# Patient Record
Sex: FEMALE | Race: WHITE | NOT HISPANIC OR LATINO | Employment: OTHER | URBAN - METROPOLITAN AREA
[De-identification: names, ages, dates, MRNs, and addresses within clinical notes are randomized per-mention and may not be internally consistent; named-entity substitution may affect disease eponyms.]

---

## 2018-08-13 ENCOUNTER — ESTABLISHED COMPREHENSIVE EXAM (OUTPATIENT)
Dept: URBAN - METROPOLITAN AREA CLINIC 36 | Facility: CLINIC | Age: 63
End: 2018-08-13

## 2018-08-13 DIAGNOSIS — H35.363: ICD-10-CM

## 2018-08-13 DIAGNOSIS — H44.23: ICD-10-CM

## 2018-08-13 DIAGNOSIS — H25.9: ICD-10-CM

## 2018-08-13 PROCEDURE — 92014 COMPRE OPH EXAM EST PT 1/>: CPT

## 2018-08-13 PROCEDURE — 92015 DETERMINE REFRACTIVE STATE: CPT

## 2018-08-13 ASSESSMENT — TONOMETRY
OD_IOP_MMHG: 16
OS_IOP_MMHG: 16

## 2018-08-13 ASSESSMENT — VISUAL ACUITY
OD_CC: 20/30
OD_CC: J3
OS_CC: J6
OS_PH: 20/40-1
OD_SC: CF 5FT
OS_CC: 20/60-2
OS_SC: CF 3FT

## 2019-04-23 ENCOUNTER — ESTABLISHED PATIENT (OUTPATIENT)
Dept: URBAN - METROPOLITAN AREA CLINIC 36 | Facility: CLINIC | Age: 64
End: 2019-04-23

## 2019-04-23 DIAGNOSIS — D23.111: ICD-10-CM

## 2019-04-23 PROCEDURE — 92285 EXTERNAL OCULAR PHOTOGRAPHY: CPT

## 2019-04-23 PROCEDURE — 99213 OFFICE O/P EST LOW 20 MIN: CPT

## 2019-04-23 PROCEDURE — 67840 REMOVE EYELID LESION: CPT

## 2019-04-23 RX ORDER — ERYTHROMYCIN 5 MG/G: OINTMENT OPHTHALMIC

## 2019-04-23 ASSESSMENT — VISUAL ACUITY
OD_CC: 20/40
OS_CC: 20/70

## 2019-09-10 ENCOUNTER — ESTABLISHED COMPREHENSIVE EXAM (OUTPATIENT)
Dept: URBAN - METROPOLITAN AREA CLINIC 36 | Facility: CLINIC | Age: 64
End: 2019-09-10

## 2019-09-10 DIAGNOSIS — H35.3132: ICD-10-CM

## 2019-09-10 DIAGNOSIS — H43.813: ICD-10-CM

## 2019-09-10 DIAGNOSIS — H52.13: ICD-10-CM

## 2019-09-10 DIAGNOSIS — H52.7: ICD-10-CM

## 2019-09-10 DIAGNOSIS — H25.812: ICD-10-CM

## 2019-09-10 DIAGNOSIS — H25.811: ICD-10-CM

## 2019-09-10 PROCEDURE — 92014 COMPRE OPH EXAM EST PT 1/>: CPT

## 2019-09-10 PROCEDURE — 92015 DETERMINE REFRACTIVE STATE: CPT

## 2019-09-10 PROCEDURE — 92134 CPTRZ OPH DX IMG PST SGM RTA: CPT

## 2019-09-10 ASSESSMENT — VISUAL ACUITY
OS_CC: <J10
OD_CC: 20/40-2
OS_SC: CF 6FT
OD_CC: J3
OD_SC: CF 6FT
OS_CC: 20/200

## 2019-09-10 ASSESSMENT — TONOMETRY
OD_IOP_MMHG: 18
OS_IOP_MMHG: 18

## 2020-02-19 ENCOUNTER — RETINA CONSULT (OUTPATIENT)
Dept: URBAN - METROPOLITAN AREA CLINIC 36 | Facility: CLINIC | Age: 65
End: 2020-02-19

## 2020-02-19 DIAGNOSIS — H35.3122: ICD-10-CM

## 2020-02-19 DIAGNOSIS — H35.363: ICD-10-CM

## 2020-02-19 DIAGNOSIS — H43.813: ICD-10-CM

## 2020-02-19 DIAGNOSIS — H35.723: ICD-10-CM

## 2020-02-19 DIAGNOSIS — H35.3112: ICD-10-CM

## 2020-02-19 PROCEDURE — 92235 FLUORESCEIN ANGRPH MLTIFRAME: CPT

## 2020-02-19 PROCEDURE — 92250 FUNDUS PHOTOGRAPHY W/I&R: CPT

## 2020-02-19 PROCEDURE — 92134 CPTRZ OPH DX IMG PST SGM RTA: CPT

## 2020-02-19 PROCEDURE — 92014 COMPRE OPH EXAM EST PT 1/>: CPT

## 2020-02-19 ASSESSMENT — VISUAL ACUITY
OS_CC: 20/60
OD_CC: 20/30-2

## 2020-02-19 ASSESSMENT — TONOMETRY
OS_IOP_MMHG: 13
OD_IOP_MMHG: 14

## 2020-05-20 ENCOUNTER — ESTABLISHED PATIENT (OUTPATIENT)
Dept: URBAN - METROPOLITAN AREA CLINIC 43 | Facility: CLINIC | Age: 65
End: 2020-05-20

## 2020-05-20 DIAGNOSIS — H35.3211: ICD-10-CM

## 2020-05-20 DIAGNOSIS — H35.733: ICD-10-CM

## 2020-05-20 DIAGNOSIS — H35.363: ICD-10-CM

## 2020-05-20 DIAGNOSIS — H35.3221: ICD-10-CM

## 2020-05-20 DIAGNOSIS — H43.813: ICD-10-CM

## 2020-05-20 PROCEDURE — 92201 OPSCPY EXTND RTA DRAW UNI/BI: CPT

## 2020-05-20 PROCEDURE — 92014 COMPRE OPH EXAM EST PT 1/>: CPT

## 2020-05-20 PROCEDURE — 92242 FLUORESCEIN&ICG ANGIOGRAPHY: CPT

## 2020-05-20 PROCEDURE — 92134 CPTRZ OPH DX IMG PST SGM RTA: CPT

## 2020-05-20 PROCEDURE — 6702850 BILATERAL INTRAVITREAL INJECTION

## 2020-05-20 ASSESSMENT — VISUAL ACUITY
OS_CC: 20/70-2
OD_CC: 20/40+2

## 2020-06-30 ENCOUNTER — ESTABLISHED PATIENT (OUTPATIENT)
Dept: URBAN - METROPOLITAN AREA CLINIC 36 | Facility: CLINIC | Age: 65
End: 2020-06-30

## 2020-06-30 DIAGNOSIS — H43.813: ICD-10-CM

## 2020-06-30 DIAGNOSIS — H35.3211: ICD-10-CM

## 2020-06-30 DIAGNOSIS — H35.733: ICD-10-CM

## 2020-06-30 DIAGNOSIS — H35.363: ICD-10-CM

## 2020-06-30 DIAGNOSIS — H35.3221: ICD-10-CM

## 2020-06-30 PROCEDURE — 92014 COMPRE OPH EXAM EST PT 1/>: CPT

## 2020-06-30 PROCEDURE — 92201 OPSCPY EXTND RTA DRAW UNI/BI: CPT

## 2020-06-30 PROCEDURE — 6702850 BILATERAL INTRAVITREAL INJECTION

## 2020-06-30 PROCEDURE — 92134 CPTRZ OPH DX IMG PST SGM RTA: CPT

## 2020-06-30 ASSESSMENT — VISUAL ACUITY
OS_CC: 20/70+2
OD_CC: 20/40+1

## 2020-06-30 ASSESSMENT — TONOMETRY
OD_IOP_MMHG: 11
OS_IOP_MMHG: 12

## 2020-07-28 ENCOUNTER — ESTABLISHED PATIENT (OUTPATIENT)
Dept: URBAN - METROPOLITAN AREA CLINIC 36 | Facility: CLINIC | Age: 65
End: 2020-07-28

## 2020-07-28 DIAGNOSIS — H35.363: ICD-10-CM

## 2020-07-28 DIAGNOSIS — H43.813: ICD-10-CM

## 2020-07-28 DIAGNOSIS — H35.733: ICD-10-CM

## 2020-07-28 DIAGNOSIS — H35.3221: ICD-10-CM

## 2020-07-28 DIAGNOSIS — H35.3211: ICD-10-CM

## 2020-07-28 PROCEDURE — 92134 CPTRZ OPH DX IMG PST SGM RTA: CPT

## 2020-07-28 PROCEDURE — 92012 INTRM OPH EXAM EST PATIENT: CPT

## 2020-07-28 PROCEDURE — 6702850 BILATERAL INTRAVITREAL INJECTION

## 2020-07-28 PROCEDURE — 92250 FUNDUS PHOTOGRAPHY W/I&R: CPT

## 2020-07-28 PROCEDURE — 92235 FLUORESCEIN ANGRPH MLTIFRAME: CPT

## 2020-07-28 ASSESSMENT — VISUAL ACUITY
OD_CC: 20/40-1
OS_CC: 20/100

## 2020-07-28 ASSESSMENT — TONOMETRY
OD_IOP_MMHG: 12
OS_IOP_MMHG: 14

## 2020-09-02 ENCOUNTER — ESTABLISHED PATIENT (OUTPATIENT)
Dept: URBAN - METROPOLITAN AREA CLINIC 36 | Facility: CLINIC | Age: 65
End: 2020-09-02

## 2020-09-02 DIAGNOSIS — H35.30: ICD-10-CM

## 2020-09-02 DIAGNOSIS — H35.3221: ICD-10-CM

## 2020-09-02 DIAGNOSIS — H35.733: ICD-10-CM

## 2020-09-02 DIAGNOSIS — H43.813: ICD-10-CM

## 2020-09-02 DIAGNOSIS — H35.363: ICD-10-CM

## 2020-09-02 DIAGNOSIS — H35.3211: ICD-10-CM

## 2020-09-02 PROCEDURE — 92202 OPSCPY EXTND ON/MAC DRAW: CPT

## 2020-09-02 PROCEDURE — 92134 CPTRZ OPH DX IMG PST SGM RTA: CPT

## 2020-09-02 PROCEDURE — 92012 INTRM OPH EXAM EST PATIENT: CPT

## 2020-09-02 PROCEDURE — 6702850 BILATERAL INTRAVITREAL INJECTION

## 2020-09-02 ASSESSMENT — TONOMETRY
OS_IOP_MMHG: 17
OD_IOP_MMHG: 16

## 2020-09-02 ASSESSMENT — VISUAL ACUITY
OS_CC: 20/70
OD_CC: 20/40-2

## 2020-09-17 ENCOUNTER — ESTABLISHED COMPREHENSIVE EXAM (OUTPATIENT)
Dept: URBAN - METROPOLITAN AREA CLINIC 36 | Facility: CLINIC | Age: 65
End: 2020-09-17

## 2020-09-17 DIAGNOSIS — H52.7: ICD-10-CM

## 2020-09-17 DIAGNOSIS — H25.812: ICD-10-CM

## 2020-09-17 DIAGNOSIS — H01.003: ICD-10-CM

## 2020-09-17 DIAGNOSIS — H01.006: ICD-10-CM

## 2020-09-17 DIAGNOSIS — H25.811: ICD-10-CM

## 2020-09-17 PROCEDURE — 92015 DETERMINE REFRACTIVE STATE: CPT

## 2020-09-17 PROCEDURE — 99212 OFFICE O/P EST SF 10 MIN: CPT

## 2020-09-17 ASSESSMENT — VISUAL ACUITY
OD_SC: CF 6FT
OD_CC: 20/30-1
OS_SC: J3
OS_SC: CF 5FT
OS_CC: 20/100+1
OD_SC: J1
OD_CC: J5
OS_CC: J10

## 2020-09-17 ASSESSMENT — TONOMETRY
OS_IOP_MMHG: 18
OD_IOP_MMHG: 17

## 2020-10-05 ENCOUNTER — ESTABLISHED PATIENT (OUTPATIENT)
Dept: URBAN - METROPOLITAN AREA CLINIC 43 | Facility: CLINIC | Age: 65
End: 2020-10-05

## 2020-10-05 DIAGNOSIS — H35.3221: ICD-10-CM

## 2020-10-05 DIAGNOSIS — H43.813: ICD-10-CM

## 2020-10-05 DIAGNOSIS — H35.3211: ICD-10-CM

## 2020-10-05 DIAGNOSIS — H35.363: ICD-10-CM

## 2020-10-05 DIAGNOSIS — H35.733: ICD-10-CM

## 2020-10-05 PROCEDURE — 92014 COMPRE OPH EXAM EST PT 1/>: CPT

## 2020-10-05 PROCEDURE — 6702850 BILATERAL INTRAVITREAL INJECTION

## 2020-10-05 PROCEDURE — 92202 OPSCPY EXTND ON/MAC DRAW: CPT

## 2020-10-05 PROCEDURE — 92134 CPTRZ OPH DX IMG PST SGM RTA: CPT

## 2020-10-05 ASSESSMENT — TONOMETRY
OS_IOP_MMHG: 16
OD_IOP_MMHG: 16

## 2020-10-05 ASSESSMENT — VISUAL ACUITY
OS_SC: 20/70-1
OD_SC: 20/40

## 2020-11-17 ENCOUNTER — ESTABLISHED PATIENT (OUTPATIENT)
Dept: URBAN - METROPOLITAN AREA CLINIC 36 | Facility: CLINIC | Age: 65
End: 2020-11-17

## 2020-11-17 DIAGNOSIS — H35.3211: ICD-10-CM

## 2020-11-17 DIAGNOSIS — H35.3221: ICD-10-CM

## 2020-11-17 DIAGNOSIS — H35.733: ICD-10-CM

## 2020-11-17 DIAGNOSIS — H43.813: ICD-10-CM

## 2020-11-17 DIAGNOSIS — H35.363: ICD-10-CM

## 2020-11-17 PROCEDURE — 6702850 BILATERAL INTRAVITREAL INJECTION

## 2020-11-17 PROCEDURE — 92235 FLUORESCEIN ANGRPH MLTIFRAME: CPT

## 2020-11-17 PROCEDURE — 92012 INTRM OPH EXAM EST PATIENT: CPT

## 2020-11-17 PROCEDURE — 92250 FUNDUS PHOTOGRAPHY W/I&R: CPT

## 2020-11-17 ASSESSMENT — VISUAL ACUITY
OD_CC: 20/40
OS_CC: 20/70
OS_PH: 20/50

## 2020-11-17 ASSESSMENT — TONOMETRY
OS_IOP_MMHG: 15
OD_IOP_MMHG: 14

## 2021-01-12 ENCOUNTER — ESTABLISHED PATIENT (OUTPATIENT)
Dept: URBAN - METROPOLITAN AREA CLINIC 36 | Facility: CLINIC | Age: 66
End: 2021-01-12

## 2021-01-12 DIAGNOSIS — H35.30: ICD-10-CM

## 2021-01-12 DIAGNOSIS — H35.733: ICD-10-CM

## 2021-01-12 DIAGNOSIS — H43.813: ICD-10-CM

## 2021-01-12 DIAGNOSIS — H35.363: ICD-10-CM

## 2021-01-12 DIAGNOSIS — H35.3221: ICD-10-CM

## 2021-01-12 DIAGNOSIS — H35.3211: ICD-10-CM

## 2021-01-12 PROCEDURE — 92250 FUNDUS PHOTOGRAPHY W/I&R: CPT

## 2021-01-12 PROCEDURE — 92235 FLUORESCEIN ANGRPH MLTIFRAME: CPT

## 2021-01-12 PROCEDURE — 99213 OFFICE O/P EST LOW 20 MIN: CPT

## 2021-01-12 PROCEDURE — 6702850 BILATERAL INTRAVITREAL INJECTION

## 2021-01-12 ASSESSMENT — TONOMETRY
OD_IOP_MMHG: 11
OS_IOP_MMHG: 12

## 2021-01-12 ASSESSMENT — VISUAL ACUITY
OD_CC: 20/30-2
OS_CC: 20/50
OS_PH: 20/40-1

## 2021-03-17 ENCOUNTER — ESTABLISHED PATIENT (OUTPATIENT)
Dept: URBAN - METROPOLITAN AREA CLINIC 36 | Facility: CLINIC | Age: 66
End: 2021-03-17

## 2021-03-17 DIAGNOSIS — H43.813: ICD-10-CM

## 2021-03-17 DIAGNOSIS — H35.3211: ICD-10-CM

## 2021-03-17 DIAGNOSIS — H35.3221: ICD-10-CM

## 2021-03-17 DIAGNOSIS — H35.363: ICD-10-CM

## 2021-03-17 DIAGNOSIS — H35.733: ICD-10-CM

## 2021-03-17 PROCEDURE — 6702850 BILATERAL INTRAVITREAL INJECTION

## 2021-03-17 PROCEDURE — 99213 OFFICE O/P EST LOW 20 MIN: CPT

## 2021-03-17 PROCEDURE — 92250 FUNDUS PHOTOGRAPHY W/I&R: CPT

## 2021-03-17 ASSESSMENT — VISUAL ACUITY
OS_PH: 20/40-2
OS_CC: 20/60-1
OD_CC: 20/40-1

## 2021-03-17 ASSESSMENT — TONOMETRY
OD_IOP_MMHG: 20
OS_IOP_MMHG: 20

## 2021-06-09 ENCOUNTER — ESTABLISHED PATIENT (OUTPATIENT)
Dept: URBAN - METROPOLITAN AREA CLINIC 36 | Facility: CLINIC | Age: 66
End: 2021-06-09

## 2021-06-09 DIAGNOSIS — H35.3221: ICD-10-CM

## 2021-06-09 DIAGNOSIS — H35.733: ICD-10-CM

## 2021-06-09 DIAGNOSIS — H43.813: ICD-10-CM

## 2021-06-09 DIAGNOSIS — H35.3211: ICD-10-CM

## 2021-06-09 DIAGNOSIS — H35.363: ICD-10-CM

## 2021-06-09 PROCEDURE — 92250 FUNDUS PHOTOGRAPHY W/I&R: CPT

## 2021-06-09 PROCEDURE — 6702850 BILATERAL INTRAVITREAL INJECTION

## 2021-06-09 PROCEDURE — 92235 FLUORESCEIN ANGRPH MLTIFRAME: CPT

## 2021-06-09 PROCEDURE — 99213 OFFICE O/P EST LOW 20 MIN: CPT

## 2021-06-09 ASSESSMENT — VISUAL ACUITY
OS_CC: 20/50-1
OD_CC: 20/60-2
OD_PH: 20/50+1
OS_PH: 20/40

## 2021-06-09 ASSESSMENT — TONOMETRY
OD_IOP_MMHG: 19
OS_IOP_MMHG: 18

## 2021-08-24 ENCOUNTER — ESTABLISHED PATIENT (OUTPATIENT)
Dept: URBAN - METROPOLITAN AREA CLINIC 36 | Facility: CLINIC | Age: 66
End: 2021-08-24

## 2021-08-24 DIAGNOSIS — H35.733: ICD-10-CM

## 2021-08-24 DIAGNOSIS — H35.363: ICD-10-CM

## 2021-08-24 DIAGNOSIS — H35.3221: ICD-10-CM

## 2021-08-24 DIAGNOSIS — H43.813: ICD-10-CM

## 2021-08-24 DIAGNOSIS — H35.3211: ICD-10-CM

## 2021-08-24 PROCEDURE — 99214 OFFICE O/P EST MOD 30 MIN: CPT

## 2021-08-24 PROCEDURE — 6702850 BILATERAL INTRAVITREAL INJECTION

## 2021-08-24 PROCEDURE — 92250 FUNDUS PHOTOGRAPHY W/I&R: CPT

## 2021-08-24 ASSESSMENT — VISUAL ACUITY
OS_PH: 20/30-1
OD_CC: 20/30-2
OS_CC: 20/50-1

## 2021-08-24 ASSESSMENT — TONOMETRY
OS_IOP_MMHG: 19
OD_IOP_MMHG: 17

## 2021-11-16 ENCOUNTER — ESTABLISHED PATIENT (OUTPATIENT)
Dept: URBAN - METROPOLITAN AREA CLINIC 36 | Facility: CLINIC | Age: 66
End: 2021-11-16

## 2021-11-16 DIAGNOSIS — H35.3221: ICD-10-CM

## 2021-11-16 DIAGNOSIS — H35.733: ICD-10-CM

## 2021-11-16 DIAGNOSIS — H35.3211: ICD-10-CM

## 2021-11-16 PROCEDURE — 6702850 BILATERAL INTRAVITREAL INJECTION

## 2021-11-16 PROCEDURE — 99213 OFFICE O/P EST LOW 20 MIN: CPT

## 2021-11-16 PROCEDURE — 92250 FUNDUS PHOTOGRAPHY W/I&R: CPT

## 2021-11-16 ASSESSMENT — VISUAL ACUITY
OS_CC: 20/50-2
OS_PH: 20/40+2
OD_CC: 20/50+2

## 2021-11-16 ASSESSMENT — TONOMETRY
OS_IOP_MMHG: 17
OD_IOP_MMHG: 17

## 2022-01-07 ENCOUNTER — COMPREHENSIVE EXAM (OUTPATIENT)
Dept: URBAN - METROPOLITAN AREA CLINIC 36 | Facility: CLINIC | Age: 67
End: 2022-01-07

## 2022-01-07 DIAGNOSIS — H52.7: ICD-10-CM

## 2022-01-07 DIAGNOSIS — H25.811: ICD-10-CM

## 2022-01-07 DIAGNOSIS — H35.3211: ICD-10-CM

## 2022-01-07 DIAGNOSIS — H35.733: ICD-10-CM

## 2022-01-07 DIAGNOSIS — H35.3221: ICD-10-CM

## 2022-01-07 DIAGNOSIS — H35.363: ICD-10-CM

## 2022-01-07 DIAGNOSIS — H52.13: ICD-10-CM

## 2022-01-07 DIAGNOSIS — H25.812: ICD-10-CM

## 2022-01-07 DIAGNOSIS — H43.813: ICD-10-CM

## 2022-01-07 PROCEDURE — 92015 DETERMINE REFRACTIVE STATE: CPT

## 2022-01-07 PROCEDURE — 92014 COMPRE OPH EXAM EST PT 1/>: CPT

## 2022-01-07 ASSESSMENT — VISUAL ACUITY
OS_SC: J3
OS_PH: 20/40
OS_CC: J12
OD_CC: J10
OS_CC: 20/50
OD_SC: J4
OD_CC: 20/60-1
OS_SC: CF 6FT
OD_SC: CF 6FT

## 2022-01-10 NOTE — PATIENT DISCUSSION
NO HOLES. NO TEARS. RETINAL DETACHMENT WARNINGS DISCUSSED. FLOATERS AND FLASHES BROCHURE GIVEN. RETURN FOR FOLLOW-UP AS SCHEDULED.

## 2022-01-18 NOTE — PATIENT DISCUSSION
Binocular [id number]&nbsp;SN &nbsp; &nbsp; elt Xelmelvaz Pregnancy And Lactation Text: This medication is Pregnancy Category D and is not considered safe during pregnancy.  The risk during breast feeding is also uncertain.

## 2022-02-16 ENCOUNTER — CLINIC PROCEDURE ONLY (OUTPATIENT)
Dept: URBAN - METROPOLITAN AREA CLINIC 36 | Facility: CLINIC | Age: 67
End: 2022-02-16

## 2022-02-16 DIAGNOSIS — H35.3221: ICD-10-CM

## 2022-02-16 DIAGNOSIS — H35.3211: ICD-10-CM

## 2022-02-16 PROCEDURE — 6702850 BILATERAL INTRAVITREAL INJECTION

## 2022-02-16 PROCEDURE — 92134 CPTRZ OPH DX IMG PST SGM RTA: CPT

## 2022-02-16 ASSESSMENT — TONOMETRY
OS_IOP_MMHG: 17
OD_IOP_MMHG: 15

## 2022-02-16 ASSESSMENT — VISUAL ACUITY
OD_CC: 20/50-2
OS_CC: 20/30-2

## 2022-05-31 ENCOUNTER — ESTABLISHED PATIENT (OUTPATIENT)
Dept: URBAN - METROPOLITAN AREA CLINIC 36 | Facility: CLINIC | Age: 67
End: 2022-05-31

## 2022-05-31 DIAGNOSIS — H35.363: ICD-10-CM

## 2022-05-31 DIAGNOSIS — H35.733: ICD-10-CM

## 2022-05-31 DIAGNOSIS — H43.813: ICD-10-CM

## 2022-05-31 DIAGNOSIS — H35.3231: ICD-10-CM

## 2022-05-31 PROCEDURE — 6702850 BILATERAL INTRAVITREAL INJECTION

## 2022-05-31 PROCEDURE — 92250 FUNDUS PHOTOGRAPHY W/I&R: CPT

## 2022-05-31 PROCEDURE — 99214 OFFICE O/P EST MOD 30 MIN: CPT

## 2022-05-31 ASSESSMENT — TONOMETRY
OD_IOP_MMHG: 13
OS_IOP_MMHG: 13

## 2022-05-31 ASSESSMENT — VISUAL ACUITY
OD_CC: 20/40+2
OS_CC: 20/30+2

## 2022-10-26 ENCOUNTER — ESTABLISHED PATIENT (OUTPATIENT)
Dept: URBAN - METROPOLITAN AREA CLINIC 36 | Facility: CLINIC | Age: 67
End: 2022-10-26

## 2022-10-26 DIAGNOSIS — H43.813: ICD-10-CM

## 2022-10-26 DIAGNOSIS — H35.3231: ICD-10-CM

## 2022-10-26 DIAGNOSIS — H35.363: ICD-10-CM

## 2022-10-26 DIAGNOSIS — H35.733: ICD-10-CM

## 2022-10-26 PROCEDURE — 92250 FUNDUS PHOTOGRAPHY W/I&R: CPT

## 2022-10-26 PROCEDURE — 6702850 BILATERAL INTRAVITREAL INJECTION

## 2022-10-26 PROCEDURE — 99214 OFFICE O/P EST MOD 30 MIN: CPT

## 2022-10-26 ASSESSMENT — VISUAL ACUITY
OS_CC: 20/30-1
OD_PH: 20/25-1
OD_CC: 20/40-2

## 2022-10-26 ASSESSMENT — TONOMETRY
OD_IOP_MMHG: 15
OS_IOP_MMHG: 14

## 2023-05-24 ENCOUNTER — ESTABLISHED PATIENT (OUTPATIENT)
Dept: URBAN - METROPOLITAN AREA CLINIC 36 | Facility: CLINIC | Age: 68
End: 2023-05-24

## 2023-05-24 DIAGNOSIS — H01.003: ICD-10-CM

## 2023-05-24 DIAGNOSIS — H35.3231: ICD-10-CM

## 2023-05-24 DIAGNOSIS — H01.006: ICD-10-CM

## 2023-05-24 DIAGNOSIS — H35.363: ICD-10-CM

## 2023-05-24 DIAGNOSIS — H25.813: ICD-10-CM

## 2023-05-24 DIAGNOSIS — H43.813: ICD-10-CM

## 2023-05-24 DIAGNOSIS — H35.30: ICD-10-CM

## 2023-05-24 DIAGNOSIS — H35.733: ICD-10-CM

## 2023-05-24 PROCEDURE — 99213 OFFICE O/P EST LOW 20 MIN: CPT

## 2023-05-24 PROCEDURE — 67028 INJECTION EYE DRUG: CPT

## 2023-05-24 PROCEDURE — 92250 FUNDUS PHOTOGRAPHY W/I&R: CPT

## 2023-05-24 ASSESSMENT — VISUAL ACUITY
OS_CC: 20/40
OD_CC: 20/30-2

## 2023-05-24 ASSESSMENT — TONOMETRY
OD_IOP_MMHG: 13
OS_IOP_MMHG: 14

## 2023-08-30 ENCOUNTER — ESTABLISHED PATIENT (OUTPATIENT)
Dept: URBAN - METROPOLITAN AREA CLINIC 43 | Facility: CLINIC | Age: 68
End: 2023-08-30

## 2023-08-30 DIAGNOSIS — H43.813: ICD-10-CM

## 2023-08-30 DIAGNOSIS — H25.813: ICD-10-CM

## 2023-08-30 DIAGNOSIS — H53.8: ICD-10-CM

## 2023-08-30 DIAGNOSIS — H35.30: ICD-10-CM

## 2023-08-30 DIAGNOSIS — H35.363: ICD-10-CM

## 2023-08-30 DIAGNOSIS — H35.733: ICD-10-CM

## 2023-08-30 DIAGNOSIS — H35.3231: ICD-10-CM

## 2023-08-30 PROCEDURE — 67028 INJECTION EYE DRUG: CPT

## 2023-08-30 PROCEDURE — 92250 FUNDUS PHOTOGRAPHY W/I&R: CPT

## 2023-08-30 PROCEDURE — 99214 OFFICE O/P EST MOD 30 MIN: CPT

## 2023-08-30 ASSESSMENT — TONOMETRY
OS_IOP_MMHG: 15
OD_IOP_MMHG: 14

## 2023-08-30 ASSESSMENT — VISUAL ACUITY
OS_CC: 20/25+1
OD_CC: 20/20-1

## 2023-11-29 ENCOUNTER — ESTABLISHED PATIENT (OUTPATIENT)
Dept: URBAN - METROPOLITAN AREA CLINIC 36 | Facility: CLINIC | Age: 68
End: 2023-11-29

## 2023-11-29 DIAGNOSIS — H35.363: ICD-10-CM

## 2023-11-29 DIAGNOSIS — H35.3231: ICD-10-CM

## 2023-11-29 DIAGNOSIS — H43.813: ICD-10-CM

## 2023-11-29 DIAGNOSIS — H35.30: ICD-10-CM

## 2023-11-29 DIAGNOSIS — H25.813: ICD-10-CM

## 2023-11-29 DIAGNOSIS — H35.733: ICD-10-CM

## 2023-11-29 PROCEDURE — 99213 OFFICE O/P EST LOW 20 MIN: CPT

## 2023-11-29 PROCEDURE — 92250 FUNDUS PHOTOGRAPHY W/I&R: CPT

## 2023-11-29 PROCEDURE — 67028 INJECTION EYE DRUG: CPT

## 2023-11-29 ASSESSMENT — VISUAL ACUITY
OS_CC: 20/40+2
OD_CC: 20/30-1

## 2023-11-29 ASSESSMENT — TONOMETRY
OS_IOP_MMHG: 11
OD_IOP_MMHG: 11

## 2024-03-15 ENCOUNTER — ESTABLISHED PATIENT (OUTPATIENT)
Dept: URBAN - METROPOLITAN AREA CLINIC 43 | Facility: CLINIC | Age: 69
End: 2024-03-15

## 2024-03-15 DIAGNOSIS — H43.813: ICD-10-CM

## 2024-03-15 DIAGNOSIS — H25.813: ICD-10-CM

## 2024-03-15 DIAGNOSIS — H04.123: ICD-10-CM

## 2024-03-15 DIAGNOSIS — H35.30: ICD-10-CM

## 2024-03-15 DIAGNOSIS — H35.733: ICD-10-CM

## 2024-03-15 DIAGNOSIS — H35.363: ICD-10-CM

## 2024-03-15 DIAGNOSIS — H35.3231: ICD-10-CM

## 2024-03-15 PROCEDURE — 92250 FUNDUS PHOTOGRAPHY W/I&R: CPT

## 2024-03-15 PROCEDURE — 67028 INJECTION EYE DRUG: CPT

## 2024-03-15 PROCEDURE — 99213 OFFICE O/P EST LOW 20 MIN: CPT

## 2024-03-15 ASSESSMENT — VISUAL ACUITY
OD_CC: 20/30-2
OS_CC: 20/25-2
OD_PH: 20/20

## 2024-03-15 ASSESSMENT — TONOMETRY
OS_IOP_MMHG: 12
OD_IOP_MMHG: 13

## 2024-06-12 ENCOUNTER — ESTABLISHED PATIENT (OUTPATIENT)
Dept: URBAN - METROPOLITAN AREA CLINIC 36 | Facility: CLINIC | Age: 69
End: 2024-06-12

## 2024-06-12 DIAGNOSIS — H04.123: ICD-10-CM

## 2024-06-12 DIAGNOSIS — H25.813: ICD-10-CM

## 2024-06-12 DIAGNOSIS — H52.13: ICD-10-CM

## 2024-06-12 DIAGNOSIS — H35.30: ICD-10-CM

## 2024-06-12 DIAGNOSIS — H35.363: ICD-10-CM

## 2024-06-12 DIAGNOSIS — H35.3231: ICD-10-CM

## 2024-06-12 DIAGNOSIS — H43.813: ICD-10-CM

## 2024-06-12 DIAGNOSIS — H35.733: ICD-10-CM

## 2024-06-12 PROCEDURE — 67028 INJECTION EYE DRUG: CPT | Mod: 50,RT

## 2024-06-12 PROCEDURE — 92250 FUNDUS PHOTOGRAPHY W/I&R: CPT

## 2024-06-12 PROCEDURE — 99213 OFFICE O/P EST LOW 20 MIN: CPT | Mod: 25

## 2024-06-12 ASSESSMENT — TONOMETRY
OS_IOP_MMHG: 12
OD_IOP_MMHG: 11

## 2024-06-12 ASSESSMENT — VISUAL ACUITY
OS_CC: 20/40-2
OD_CC: 20/40-1

## 2024-07-22 ENCOUNTER — PREPPED CHART (OUTPATIENT)
Dept: URBAN - METROPOLITAN AREA CLINIC 36 | Facility: CLINIC | Age: 69
End: 2024-07-22

## 2024-08-27 ENCOUNTER — PREPPED CHART (OUTPATIENT)
Dept: URBAN - METROPOLITAN AREA CLINIC 36 | Facility: CLINIC | Age: 69
End: 2024-08-27

## 2024-09-17 ENCOUNTER — COMPREHENSIVE EXAM (OUTPATIENT)
Dept: URBAN - METROPOLITAN AREA CLINIC 46 | Facility: CLINIC | Age: 69
End: 2024-09-17

## 2024-09-17 DIAGNOSIS — H35.733: ICD-10-CM

## 2024-09-17 DIAGNOSIS — H35.3231: ICD-10-CM

## 2024-09-17 DIAGNOSIS — H43.813: ICD-10-CM

## 2024-09-17 DIAGNOSIS — H04.123: ICD-10-CM

## 2024-09-17 PROCEDURE — 99213 OFFICE O/P EST LOW 20 MIN: CPT | Mod: 25

## 2024-09-17 PROCEDURE — 67028 INJECTION EYE DRUG: CPT | Mod: 50,RT

## 2024-09-17 PROCEDURE — 92250 FUNDUS PHOTOGRAPHY W/I&R: CPT

## 2024-09-17 PROCEDURE — 92134 CPTRZ OPH DX IMG PST SGM RTA: CPT | Mod: NC

## 2024-12-20 ENCOUNTER — COMPREHENSIVE EXAM (OUTPATIENT)
Age: 69
End: 2024-12-20

## 2024-12-20 DIAGNOSIS — H35.733: ICD-10-CM

## 2024-12-20 DIAGNOSIS — H04.123: ICD-10-CM

## 2024-12-20 DIAGNOSIS — H43.813: ICD-10-CM

## 2024-12-20 DIAGNOSIS — H35.3231: ICD-10-CM

## 2024-12-20 PROCEDURE — 67028 INJECTION EYE DRUG: CPT | Mod: 50,RT

## 2024-12-20 PROCEDURE — 92273 FULL FIELD ERG W/I&R: CPT

## 2024-12-20 PROCEDURE — 92250 FUNDUS PHOTOGRAPHY W/I&R: CPT

## 2024-12-20 PROCEDURE — 99213 OFFICE O/P EST LOW 20 MIN: CPT | Mod: 25

## 2025-03-19 ENCOUNTER — COMPREHENSIVE EXAM (OUTPATIENT)
Age: 70
End: 2025-03-19